# Patient Record
Sex: MALE | Race: WHITE | NOT HISPANIC OR LATINO | Employment: FULL TIME | URBAN - METROPOLITAN AREA
[De-identification: names, ages, dates, MRNs, and addresses within clinical notes are randomized per-mention and may not be internally consistent; named-entity substitution may affect disease eponyms.]

---

## 2017-02-03 ENCOUNTER — TELEPHONE (OUTPATIENT)
Dept: CARDIOLOGY | Facility: MEDICAL CENTER | Age: 57
End: 2017-02-03

## 2017-02-03 DIAGNOSIS — N52.9 ERECTILE DYSFUNCTION, UNSPECIFIED ERECTILE DYSFUNCTION TYPE: ICD-10-CM

## 2017-02-03 RX ORDER — SILDENAFIL 100 MG/1
100 TABLET, FILM COATED ORAL PRN
Qty: 10 TAB | Refills: 1 | Status: SHIPPED | OUTPATIENT
Start: 2017-02-03 | End: 2017-05-25 | Stop reason: SDUPTHER

## 2017-02-03 NOTE — TELEPHONE ENCOUNTER
----- Message from Florencia Quezada R.N. sent at 2/2/2017 11:31 AM PST -----  Regarding: requesting Viagra  CHANEL Andrews to RF this?  ----- Message -----     From: Lucero Gordon, Med Ass't     Sent: 2/2/2017  10:35 AM       To: Florencia Quezada R.N.  Subject: new RX                                           Patient is requesting a new RX for Viagra 100mg, please call patient back  Thank you

## 2017-05-01 DIAGNOSIS — I10 ESSENTIAL HYPERTENSION, BENIGN: ICD-10-CM

## 2017-05-01 RX ORDER — METOPROLOL SUCCINATE 50 MG/1
50 TABLET, EXTENDED RELEASE ORAL DAILY
Qty: 90 TAB | Refills: 0 | Status: CANCELLED | OUTPATIENT
Start: 2017-05-01

## 2017-05-01 RX ORDER — METOPROLOL SUCCINATE 50 MG/1
50 TABLET, EXTENDED RELEASE ORAL DAILY
Qty: 90 TAB | Refills: 3 | Status: SHIPPED | OUTPATIENT
Start: 2017-05-01 | End: 2018-05-12 | Stop reason: SDUPTHER

## 2017-05-19 ENCOUNTER — TELEPHONE (OUTPATIENT)
Dept: CARDIOLOGY | Facility: MEDICAL CENTER | Age: 57
End: 2017-05-19

## 2017-05-19 NOTE — TELEPHONE ENCOUNTER
----- Message from Shayne Najera Ass't sent at 5/19/2017  1:29 PM PDT -----  Regarding: Blood Work  Pt has appt. Coming up w/ SS on 5/25 @8:20am.  S/w pt states he has not had any recent labwork, pt is asking if he needs lab work before appt. Pt OMAYRA is in [place] and will be flying out Wednesday night to make it to appt.

## 2017-05-19 NOTE — TELEPHONE ENCOUNTER
Left detailed message for pt. That he should wait until appointment with Dr. Marshall to get a lab order, since appointment is in 5 days and he is in [place].

## 2017-05-25 ENCOUNTER — OFFICE VISIT (OUTPATIENT)
Dept: CARDIOLOGY | Facility: MEDICAL CENTER | Age: 57
End: 2017-05-25
Payer: COMMERCIAL

## 2017-05-25 VITALS
WEIGHT: 170 LBS | BODY MASS INDEX: 24.34 KG/M2 | OXYGEN SATURATION: 96 % | SYSTOLIC BLOOD PRESSURE: 130 MMHG | DIASTOLIC BLOOD PRESSURE: 80 MMHG | HEART RATE: 74 BPM | HEIGHT: 70 IN

## 2017-05-25 DIAGNOSIS — Z72.0 TOBACCO USE: ICD-10-CM

## 2017-05-25 DIAGNOSIS — N52.9 ERECTILE DYSFUNCTION, UNSPECIFIED ERECTILE DYSFUNCTION TYPE: ICD-10-CM

## 2017-05-25 DIAGNOSIS — I10 ESSENTIAL HYPERTENSION, BENIGN: ICD-10-CM

## 2017-05-25 DIAGNOSIS — I25.10 CORONARY ARTERY DISEASE INVOLVING NATIVE CORONARY ARTERY OF NATIVE HEART WITHOUT ANGINA PECTORIS: ICD-10-CM

## 2017-05-25 DIAGNOSIS — Z95.5 STENTED CORONARY ARTERY: ICD-10-CM

## 2017-05-25 PROCEDURE — 99213 OFFICE O/P EST LOW 20 MIN: CPT | Performed by: INTERNAL MEDICINE

## 2017-05-25 RX ORDER — SILDENAFIL 100 MG/1
100 TABLET, FILM COATED ORAL PRN
Qty: 10 TAB | Refills: 4 | Status: SHIPPED | OUTPATIENT
Start: 2017-05-25 | End: 2018-03-03 | Stop reason: SDUPTHER

## 2017-05-25 ASSESSMENT — ENCOUNTER SYMPTOMS
CONSTITUTIONAL NEGATIVE: 1
VOMITING: 0
PALPITATIONS: 0
NAUSEA: 0
WEAKNESS: 0
SHORTNESS OF BREATH: 0

## 2017-05-25 NOTE — MR AVS SNAPSHOT
"        Giovani Blake   2017 8:20 AM   Office Visit   MRN: [id number]    Department:  [facility]   Dept Phone:  [phone]    Description:  Male : 1960   Provider:  Kirit Marshall M.D.           Reason for Visit     Follow-Up           Allergies as of 2017     Allergen Noted Reactions    Lamisil [Terbinafine Hcl] 01/10/2014       Lamisil [Terbinafine Hcl] 01/10/2014   Hives, Swelling      You were diagnosed with     Coronary artery disease involving native coronary artery of native heart without angina pectoris   [2915728]       Stented coronary artery   [475840]       Essential hypertension, benign   [401.1.ICD-9-CM]       Tobacco use   [150939]       Erectile dysfunction, unspecified erectile dysfunction type   [1429588]         Vital Signs     Blood Pressure Pulse Height Weight Body Mass Index Oxygen Saturation    130/80 mmHg 74 1.778 m (5' 10\") 77.111 kg (170 lb) 24.39 kg/m2 96%    Smoking Status                   Current Every Day Smoker           Basic Information     Date Of Birth Sex Race Ethnicity Preferred Language    1960 Male White Non- English      Problem List              ICD-10-CM Priority Class Noted - Resolved    STEMI (ST elevation myocardial infarction) (CMS-HCC) I21.3 High  1/10/2014 - Present    CAD (coronary artery disease) I25.10 High  2014 - Present    Essential hypertension, benign I10 High  2014 - Present    HLD (hyperlipidemia) E78.5 High  2014 - Present    Tobacco use Z72.0 High  2014 - Present    CAD (coronary artery disease) bare-metal stent to an occluded circumflex in 2014 I25.10   2014 - Present    Hyperlipidemia E78.5   2014 - Present    Undiagnosed cardiac murmurs R01.1   2014 - Present    Stented coronary artery Z95.5   2015 - Present    Essential hypertension, benign I10   2015 - Present      Health Maintenance        Date Due Completion Dates    IMM DTaP/Tdap/Td Vaccine (1 - Tdap) " 8/20/1979 ---    IMM PNEUMOCOCCAL 19-64 (ADULT) MEDIUM RISK SERIES (1 of 1 - PPSV23) 8/20/1979 ---    COLONOSCOPY 8/20/2010 ---            Current Immunizations     No immunizations on file.      Below and/or attached are the medications your provider expects you to take. Review all of your home medications and newly ordered medications with your provider and/or pharmacist. Follow medication instructions as directed by your provider and/or pharmacist. Please keep your medication list with you and share with your provider. Update the information when medications are discontinued, doses are changed, or new medications (including over-the-counter products) are added; and carry medication information at all times in the event of emergency situations     Allergies:  LAMISIL - (reactions not documented)     LAMISIL - Hives,Swelling               Medications  Valid as of: May 25, 2017 -  8:44 AM    Generic Name Brand Name Tablet Size Instructions for use    Aspirin (Tablet Delayed Response) ECOTRIN 81 MG Take 81 mg by mouth every day.        Atorvastatin Calcium (Tab) LIPITOR 40 MG Take 1 Tab by mouth every bedtime.        Losartan Potassium (Tab) COZAAR 100 MG Take 1 Tab by mouth every day.        Metoprolol Succinate (TABLET SR 24 HR) TOPROL XL 50 MG Take 1 Tab by mouth every day.        Nitroglycerin (SL Tab) NITROSTAT 0.4 MG Place 1 Tab under tongue as needed for Chest Pain.        Sildenafil Citrate (Tab) VIAGRA 100 MG Take 1 Tab by mouth as needed for Erectile Dysfunction.        .                 Medicines prescribed today were sent to:     [facility]/PHARMACY #[id number] - [address] AT [address] [address]    Phone: [phone] Fax: [phone]    Open 24 Hours?: No      Medication refill instructions:       If your prescription bottle indicates you have medication refills left, it is not necessary to call your provider’s office. Please contact your  pharmacy and they will refill your medication.    If your prescription bottle indicates you do not have any refills left, you may request refills at any time through one of the following ways: The online [employer] system (except Urgent Care), by calling your provider’s office, or by asking your pharmacy to contact your provider’s office with a refill request. Medication refills are processed only during regular business hours and may not be available until the next business day. Your provider may request additional information or to have a follow-up visit with you prior to refilling your medication.   *Please Note: Medication refills are assigned a new Rx number when refilled electronically. Your pharmacy may indicate that no refills were authorized even though a new prescription for the same medication is available at the pharmacy. Please request the medicine by name with the pharmacy before contacting your provider for a refill.        Your To Do List     Future Labs/Procedures Complete By Expires    CBC WITH DIFFERENTIAL  As directed 11/24/2017    COMP METABOLIC PANEL  As directed 11/24/2017    LIPID PROFILE  As directed 11/24/2017    PROSTATE SPECIFIC AG DIAGNOSTIC  As directed 5/25/2018         [employer] Access Code: [email or web address]  Expires: 6/24/2017  8:44 AM    [employer]  A secure, online tool to manage your health information     [employer]’s [employer]® is a secure, online tool that connects you to your personalized health information from the privacy of your home -- day or night - making it very easy for you to manage your healthcare. Once the activation process is completed, you can even access your medical information using the [employer] clayton, which is available for free in the Apple Clayton store or Google Play store.     [employer] provides the following levels of access (as shown below):   My Chart Features   [facility] Doctor [facility]  Specialists [facility]  Urgent  Care Non-[facility]  Primary Care  Doctor    Email your healthcare team securely and privately 24/7 X X X    Manage appointments: schedule your next appointment; view details of past/upcoming appointments X      Request prescription refills. X      View recent personal medical records, including lab and immunizations X X X X   View health record, including health history, allergies, medications X X X X   Read reports about your outpatient visits, procedures, consult and ER notes X X X X   See your discharge summary, which is a recap of your hospital and/or ER visit that includes your diagnosis, lab results, and care plan. X X       How to register for [employer]:  1. Go to  [email or web address].  2. Click on the Sign Up Now box, which takes you to the New Member Sign Up page. You will need to provide the following information:  a. Enter your [employer] Access Code exactly as it appears at the top of this page. (You will not need to use this code after you’ve completed the sign-up process. If you do not sign up before the expiration date, you must request a new code.)   b. Enter your date of birth.   c. Enter your home email address.   d. Click Submit, and follow the next screen’s instructions.  3. Create a VoiceGem ID. This will be your VoiceG[employer] login ID and cannot be changed, so think of one that is secure and easy to remember.  4. Create a VoiceGem password. You can change your password at any time.  5. Enter your Password Reset Question and Answer. This can be used at a later time if you forget your password.   6. Enter your e-mail address. This allows you to receive e-mail notifications when new information is available in VoiceGem.  7. Click Sign Up. You can now view your health information.    For assistance activating your [employer] account, call [phone]

## 2017-05-25 NOTE — PROGRESS NOTES
Subjective:     Progress Notes    Giovani Blake (MR# [id number])         Progress Notes Info      Author Note Status Last Update User Last Update Date/Time     Kirit Marshall M.D. Signed Kirit Marshall M.D. 4/19/2016  1:40 PM       Progress Notes      Expand All Collapse All    Subjective:    Giovani Blake is a 56 y.o. male who presents today for follow-up of coronary artery disease. He suffered a myocardial infarction in January, 2014, and had a 3.5×23 mm millimeter vision bare metal stent placed in the circumflex. The patient remains pain free he is working hard. He is due for laboratory testing. The patient is still smoking cigarettes but is interested in quitting. He doesn't want to try Chantix.      Past Medical History    Diagnosis  Date    •  CAD (coronary artery disease)  January 2014        ACS. PCI/BMS (Vision 3.5 x 23mm) to the circumflex.    •  Hypertension      •  Hyperlipidemia      •  NSTEMI (non-ST elevated myocardial infarction)      •  COPD      •  CAD (coronary artery disease) bare-metal stent to an occluded circumflex in January 2014 12/17/2014    •  Hyperlipidemia  12/17/2014    •  Undiagnosed cardiac murmurs  12/17/2014      Past Surgical History    Procedure  Laterality  Date    •  Hernia repair          Family History    Problem  Relation  Age of Onset    •  Cancer  Father      •  Heart Disease  Father      •  Heart Attack  Father  57      History    Smoking status    •  Current Every Day Smoker -- 2.00 packs/day for 30 years    •  Types:  Cigarettes    Smokeless tobacco    •  Never Used      Allergies    Allergen  Reactions    •  Lamisil [Terbinafine Hcl]      •  Lamisil [Terbinafine Hcl]  Hives and Swelling       Encounter Medications     Outpatient Encounter Prescriptions as of 4/19/2016    Medication  Sig  Dispense  Refill    •  sildenafil citrate (VIAGRA) 100 MG tablet  Take 1 Tab by mouth as needed for Erectile Dysfunction.  10 Tab  1    •  losartan (COZAAR) 100 MG Tab  Take 1 Tab by  "mouth every day.  90 Tab  2    •  atorvastatin (LIPITOR) 40 MG Tab  Take 1 Tab by mouth every bedtime.  90 Tab  2    •  metoprolol SR (TOPROL XL) 50 MG TB24  Take 1 Tab by mouth every day.  90 Tab  3    •  aspirin EC (ECOTRIN) 81 MG TBEC  Take 81 mg by mouth every day.        •  [DISCONTINUED] sildenafil citrate (VIAGRA) 100 MG tablet  Take 1 Tab by mouth as needed for Erectile Dysfunction.  10 Tab  1    •  nitroglycerin (NITROSTAT) 0.4 MG SUBL  Place 1 Tab under tongue as needed for Chest Pain.  25 Tab  1        No facility-administered encounter medications on file as of 4/19/2016.         ROS       Objective:    /64 mmHg  Pulse 100  Ht 1.778 m (5' 10\")  Wt 76.204 kg (168 lb)  BMI 24.11 kg/m2  SpO2 94%    Physical Exam      Assessment:        1.  Coronary artery disease due to lipid rich plaque   LIPID PROFILE    2.  Essential hypertension, benign   LIPID PROFILE    3.  Stented coronary artery       4.  Hyperlipidemia   LIPID PROFILE    5.  Erectile dysfunction, unspecified erectile dysfunction type   sildenafil citrate (VIAGRA) 100 MG tablet          Medical Decision Making:  Today's Assessment / Status / Plan:      The patient will stop his losartan altogether checked pressures at home. He is currently working her [place], [place]. Smoking cessation was again recommended. We will check his lipid profile. Return in one year.                    Past Medical History   Diagnosis Date   • CAD (coronary artery disease) January 2014     ACS. PCI/BMS (Vision 3.5 x 23mm) to the circumflex.   • Hypertension    • Hyperlipidemia    • NSTEMI (non-ST elevated myocardial infarction)    • COPD    • CAD (coronary artery disease) bare-metal stent to an occluded circumflex in January 2014 12/17/2014   • Hyperlipidemia 12/17/2014   • Undiagnosed cardiac murmurs 12/17/2014     Past Surgical History   Procedure Laterality Date   • Hernia repair       Family History   Problem Relation Age of Onset   • Cancer Father    • " "Heart Disease Father    • Heart Attack Father 57     History   Smoking status   • Current Every Day Smoker -- 2.00 packs/day for 30 years   • Types: Cigarettes   Smokeless tobacco   • Never Used     Allergies   Allergen Reactions   • Lamisil [Terbinafine Hcl]    • Lamisil [Terbinafine Hcl] Hives and Swelling     Outpatient Encounter Prescriptions as of 5/25/2017   Medication Sig Dispense Refill   • sildenafil citrate (VIAGRA) 100 MG tablet Take 1 Tab by mouth as needed for Erectile Dysfunction. 10 Tab 4   • metoprolol SR (TOPROL XL) 50 MG TABLET SR 24 HR Take 1 Tab by mouth every day. 90 Tab 3   • atorvastatin (LIPITOR) 40 MG Tab Take 1 Tab by mouth every bedtime. 90 Tab 3   • aspirin EC (ECOTRIN) 81 MG TBEC Take 81 mg by mouth every day.     • [DISCONTINUED] sildenafil citrate (VIAGRA) 100 MG tablet Take 1 Tab by mouth as needed for Erectile Dysfunction. (Patient not taking: Reported on 5/25/2017) 10 Tab 1   • losartan (COZAAR) 100 MG Tab Take 1 Tab by mouth every day. (Patient not taking: Reported on 5/25/2017) 90 Tab 2   • nitroglycerin (NITROSTAT) 0.4 MG SUBL Place 1 Tab under tongue as needed for Chest Pain. 25 Tab 1     No facility-administered encounter medications on file as of 5/25/2017.     Review of Systems   Constitutional: Negative.  Negative for malaise/fatigue.   Respiratory: Negative for shortness of breath.    Cardiovascular: Negative for chest pain, palpitations and leg swelling.   Gastrointestinal: Negative for nausea and vomiting.   Neurological: Negative for weakness.        Objective:   /80 mmHg  Pulse 74  Ht 1.778 m (5' 10\")  Wt 77.111 kg (170 lb)  BMI 24.39 kg/m2  SpO2 96%    Physical Exam   Constitutional: He is oriented to person, place, and time. He appears well-developed and well-nourished. No distress.   HENT:   Head: Atraumatic.   Eyes: Conjunctivae and EOM are normal. Pupils are equal, round, and reactive to light.   Neck: Neck supple. No JVD present.   Cardiovascular: " Normal rate and regular rhythm.    No murmur heard.  Pulmonary/Chest: Effort normal and breath sounds normal. No respiratory distress. He has no wheezes. He has no rales.   Abdominal: Soft. There is no tenderness.   Musculoskeletal: He exhibits no edema.   Neurological: He is alert and oriented to person, place, and time.   Skin: Skin is warm and dry. He is not diaphoretic.       Assessment:     1. Coronary artery disease involving native coronary artery of native heart without angina pectoris     2. Stented coronary artery  LIPID PROFILE    CBC WITH DIFFERENTIAL   3. Essential hypertension, benign  COMP METABOLIC PANEL    LIPID PROFILE    CBC WITH DIFFERENTIAL    PROSTATE SPECIFIC AG DIAGNOSTIC   4. Tobacco use  PROSTATE SPECIFIC AG DIAGNOSTIC   5. Erectile dysfunction, unspecified erectile dysfunction type  sildenafil citrate (VIAGRA) 100 MG tablet       Medical Decision Making:  Today's Assessment / Status / Plan:     He continues to do well without any angina. We will obtain appropriate laboratory testing and notify him of the results. Since he lives out of town and is back here only briefly, it was difficult to get stress testing on him. We'll discuss this with him next visit and attempt to get a stress test performed either here or in the city where he is currently working.

## 2017-09-05 DIAGNOSIS — E78.5 HYPERLIPIDEMIA, UNSPECIFIED HYPERLIPIDEMIA TYPE: ICD-10-CM

## 2017-09-06 RX ORDER — ATORVASTATIN CALCIUM 40 MG/1
40 TABLET, FILM COATED ORAL
Qty: 30 TAB | Refills: 1 | Status: SHIPPED | OUTPATIENT
Start: 2017-09-06 | End: 2017-11-01 | Stop reason: SDUPTHER

## 2017-12-04 DIAGNOSIS — E78.5 HYPERLIPIDEMIA, UNSPECIFIED HYPERLIPIDEMIA TYPE: ICD-10-CM

## 2017-12-04 RX ORDER — ATORVASTATIN CALCIUM 40 MG/1
40 TABLET, FILM COATED ORAL EVERY EVENING
Qty: 90 TAB | Refills: 2 | Status: SHIPPED | OUTPATIENT
Start: 2017-12-04 | End: 2018-08-16 | Stop reason: SDUPTHER

## 2018-03-03 DIAGNOSIS — N52.9 ERECTILE DYSFUNCTION, UNSPECIFIED ERECTILE DYSFUNCTION TYPE: ICD-10-CM

## 2018-03-05 RX ORDER — SILDENAFIL 100 MG/1
100 TABLET, FILM COATED ORAL PRN
Qty: 10 TAB | Refills: 4 | Status: SHIPPED | OUTPATIENT
Start: 2018-03-05 | End: 2018-09-18 | Stop reason: SDUPTHER

## 2018-05-07 ENCOUNTER — TELEPHONE (OUTPATIENT)
Dept: CARDIOLOGY | Facility: MEDICAL CENTER | Age: 58
End: 2018-05-07

## 2018-05-07 DIAGNOSIS — E78.49 OTHER HYPERLIPIDEMIA: ICD-10-CM

## 2018-05-07 NOTE — TELEPHONE ENCOUNTER
----- Message from Juliana Rollins sent at 5/7/2018 10:23 AM PDT -----  Regarding: blood work for Aug appt  Contact: [phone]  REINA/carlos    Pt calling to ask if blood work is necessary for 8/24 appt with RS.  He wants to be prepared as he will be flying in from [place] for this appt.  Please call pt at .

## 2018-05-12 DIAGNOSIS — I10 ESSENTIAL HYPERTENSION, BENIGN: ICD-10-CM

## 2018-05-14 RX ORDER — METOPROLOL SUCCINATE 50 MG/1
50 TABLET, EXTENDED RELEASE ORAL DAILY
Qty: 90 TAB | Refills: 2 | Status: SHIPPED | OUTPATIENT
Start: 2018-05-14 | End: 2019-03-24 | Stop reason: SDUPTHER

## 2018-08-16 DIAGNOSIS — E78.5 HYPERLIPIDEMIA, UNSPECIFIED HYPERLIPIDEMIA TYPE: ICD-10-CM

## 2018-08-17 RX ORDER — ATORVASTATIN CALCIUM 40 MG/1
TABLET, FILM COATED ORAL
Qty: 90 TAB | Refills: 0 | Status: SHIPPED | OUTPATIENT
Start: 2018-08-17 | End: 2018-11-14 | Stop reason: SDUPTHER

## 2018-08-24 ENCOUNTER — OFFICE VISIT (OUTPATIENT)
Dept: CARDIOLOGY | Facility: MEDICAL CENTER | Age: 58
End: 2018-08-24

## 2018-08-24 VITALS
SYSTOLIC BLOOD PRESSURE: 140 MMHG | RESPIRATION RATE: 14 BRPM | OXYGEN SATURATION: 95 % | DIASTOLIC BLOOD PRESSURE: 78 MMHG | HEIGHT: 70 IN | BODY MASS INDEX: 23.62 KG/M2 | WEIGHT: 165 LBS | HEART RATE: 68 BPM

## 2018-08-24 DIAGNOSIS — I25.10 CORONARY ARTERY DISEASE INVOLVING NATIVE CORONARY ARTERY OF NATIVE HEART WITHOUT ANGINA PECTORIS: ICD-10-CM

## 2018-08-24 DIAGNOSIS — I10 ESSENTIAL HYPERTENSION, BENIGN: ICD-10-CM

## 2018-08-24 DIAGNOSIS — Z72.0 TOBACCO USE: ICD-10-CM

## 2018-08-24 PROCEDURE — 99213 OFFICE O/P EST LOW 20 MIN: CPT | Performed by: INTERNAL MEDICINE

## 2018-08-24 ASSESSMENT — ENCOUNTER SYMPTOMS
NEUROLOGICAL NEGATIVE: 1
GASTROINTESTINAL NEGATIVE: 1
MUSCULOSKELETAL NEGATIVE: 1
CARDIOVASCULAR NEGATIVE: 1
RESPIRATORY NEGATIVE: 1
CONSTITUTIONAL NEGATIVE: 1

## 2018-08-24 NOTE — PROGRESS NOTES
Chief Complaint   Patient presents with   • Coronary Artery Disease     Yearly check up.        Subjective:   Giovani Blake is a 58 y.o. male who presents today for follow-up of coronary disease who presented with a ST segment elevation myocardial infarction in January, 2014 and 3.5×13 mm vision bare-metal stent placed in the mid circumflex.  He continues to do well without any chest pain or exertional dyspnea.  He is due for laboratory testing.  Unfortunately he is still smoking cigarettes.  The patient is also due for treadmill testing.  Because of his employment as a , he works all over the country and is now in [place].    Past Medical History:   Diagnosis Date   • CAD (coronary artery disease) January 2014    ACS. PCI/BMS (Vision 3.5 x 23mm) to the circumflex.   • CAD (coronary artery disease) bare-metal stent to an occluded circumflex in January 2014 12/17/2014   • COPD    • Hyperlipidemia    • Hyperlipidemia 12/17/2014   • Hypertension    • NSTEMI (non-ST elevated myocardial infarction) (HCC)    • Undiagnosed cardiac murmurs 12/17/2014     Past Surgical History:   Procedure Laterality Date   • HERNIA REPAIR       Family History   Problem Relation Age of Onset   • Cancer Father    • Heart Disease Father    • Heart Attack Father 57     Social History     Social History   • Marital status:      Spouse name: N/A   • Number of children: N/A   • Years of education: N/A     Occupational History   • Not on file.     Social History Main Topics   • Smoking status: Current Every Day Smoker     Packs/day: 2.00     Years: 30.00     Types: Cigarettes   • Smokeless tobacco: Never Used   • Alcohol use 6.0 oz/week     21 Cans of beer per week   • Drug use: No   • Sexual activity: Not on file     Other Topics Concern   • Not on file     Social History Narrative    ** Merged History Encounter **          Allergies   Allergen Reactions   • Lamisil [Terbinafine Hcl]      Outpatient Encounter  "Prescriptions as of 8/24/2018   Medication Sig Dispense Refill   • atorvastatin (LIPITOR) 40 MG Tab TAKE 1 TABLET BY MOUTH EVERY EVENING 90 Tab 0   • metoprolol SR (TOPROL XL) 50 MG TABLET SR 24 HR TAKE 1 TAB BY MOUTH EVERY DAY. 90 Tab 2   • sildenafil citrate (VIAGRA) 100 MG tablet TAKE 1 TAB BY MOUTH AS NEEDED FOR ERECTILE DYSFUNCTION. 10 Tab 4   • aspirin EC (ECOTRIN) 81 MG TBEC Take 81 mg by mouth every day.     • nitroglycerin (NITROSTAT) 0.4 MG SUBL Place 1 Tab under tongue as needed for Chest Pain. 25 Tab 1   • losartan (COZAAR) 100 MG Tab Take 1 Tab by mouth every day. (Patient not taking: Reported on 5/25/2017) 90 Tab 2     No facility-administered encounter medications on file as of 8/24/2018.      Review of Systems   Constitutional: Negative.    HENT: Negative.    Respiratory: Negative.    Cardiovascular: Negative.    Gastrointestinal: Negative.    Musculoskeletal: Negative.    Skin: Negative.    Neurological: Negative.    Endo/Heme/Allergies: Negative.         Objective:   /78   Pulse 68   Resp 14   Ht 1.778 m (5' 10\")   Wt 74.8 kg (165 lb)   SpO2 95%   BMI 23.68 kg/m²     Physical Exam   Constitutional: He is oriented to person, place, and time. No distress.   HENT:   Head: Normocephalic and atraumatic.   Cardiovascular: Normal rate and regular rhythm.    No murmur heard.  Pulmonary/Chest: Breath sounds normal. No respiratory distress. He has no wheezes.   Decreased breath sounds and increased resonance to percussion compatible with COPD.   Abdominal: Soft. Bowel sounds are normal. He exhibits no distension.   Musculoskeletal: He exhibits no edema.   Neurological: He is alert and oriented to person, place, and time.   Skin: Skin is warm and dry.   Psychiatric: He has a normal mood and affect.       Assessment:     1. Coronary artery disease involving native coronary artery of native heart without angina pectoris  CBC WITH DIFFERENTIAL    LIPID PROFILE    COMP METABOLIC PANEL    NM-CARDIAC " STRESS TEST   2. Essential hypertension, benign  CBC WITH DIFFERENTIAL    LIPID PROFILE    COMP METABOLIC PANEL    NM-CARDIAC STRESS TEST   3. Tobacco use  CBC WITH DIFFERENTIAL    LIPID PROFILE    COMP METABOLIC PANEL    NM-CARDIAC STRESS TEST       Medical Decision Making:  Today's Assessment / Status / Plan:   Coronary artery disease: 4 4-1/2 years post stenting of his circumflex remains asymptomatic.  He is due for treadmill testing.  Because of his work schedule and the fact that he just changed jobs.  He will be living in [place].  As soon as he gets insurance coverage he will call us and we will arrange for stress testing with a myocardial perfusion scan.  Appropriate labs will also be ordered.    Cigarette smoking: He was informed that he has stigmata of COPD.  Smoking cessation was again strongly encouraged.    Hypertension: Under good control on his current medical regimen.  Return one  Year.

## 2018-09-18 DIAGNOSIS — N52.9 ERECTILE DYSFUNCTION, UNSPECIFIED ERECTILE DYSFUNCTION TYPE: ICD-10-CM

## 2018-09-18 RX ORDER — SILDENAFIL 100 MG/1
100 TABLET, FILM COATED ORAL PRN
Qty: 10 TAB | Refills: 1 | Status: SHIPPED | OUTPATIENT
Start: 2018-09-18 | End: 2018-11-19 | Stop reason: SDUPTHER

## 2018-11-14 DIAGNOSIS — E78.5 HYPERLIPIDEMIA, UNSPECIFIED HYPERLIPIDEMIA TYPE: ICD-10-CM

## 2018-11-14 RX ORDER — ATORVASTATIN CALCIUM 40 MG/1
40 TABLET, FILM COATED ORAL DAILY
Qty: 90 TAB | Refills: 1 | Status: SHIPPED | OUTPATIENT
Start: 2018-11-14 | End: 2019-07-29 | Stop reason: SDUPTHER

## 2018-11-19 DIAGNOSIS — N52.9 ERECTILE DYSFUNCTION, UNSPECIFIED ERECTILE DYSFUNCTION TYPE: ICD-10-CM

## 2018-11-20 RX ORDER — SILDENAFIL 100 MG/1
100 TABLET, FILM COATED ORAL PRN
Qty: 10 TAB | Refills: 3 | Status: SHIPPED | OUTPATIENT
Start: 2018-11-20 | End: 2019-03-06 | Stop reason: SDUPTHER

## 2019-03-06 DIAGNOSIS — N52.9 ERECTILE DYSFUNCTION, UNSPECIFIED ERECTILE DYSFUNCTION TYPE: ICD-10-CM

## 2019-03-07 RX ORDER — SILDENAFIL 100 MG/1
100 TABLET, FILM COATED ORAL PRN
Qty: 10 TAB | Refills: 3 | Status: SHIPPED | OUTPATIENT
Start: 2019-03-07 | End: 2019-07-29 | Stop reason: SDUPTHER

## 2019-03-24 DIAGNOSIS — I10 ESSENTIAL HYPERTENSION, BENIGN: ICD-10-CM

## 2019-03-25 RX ORDER — METOPROLOL SUCCINATE 50 MG/1
TABLET, EXTENDED RELEASE ORAL
Qty: 90 TAB | Refills: 1 | Status: SHIPPED | OUTPATIENT
Start: 2019-03-25 | End: 2019-08-24 | Stop reason: SDUPTHER

## 2019-07-29 ENCOUNTER — TELEPHONE (OUTPATIENT)
Dept: CARDIOLOGY | Facility: MEDICAL CENTER | Age: 59
End: 2019-07-29

## 2019-07-29 DIAGNOSIS — N52.9 ERECTILE DYSFUNCTION, UNSPECIFIED ERECTILE DYSFUNCTION TYPE: ICD-10-CM

## 2019-07-29 DIAGNOSIS — E78.5 HYPERLIPIDEMIA, UNSPECIFIED HYPERLIPIDEMIA TYPE: ICD-10-CM

## 2019-07-29 RX ORDER — ATORVASTATIN CALCIUM 40 MG/1
40 TABLET, FILM COATED ORAL DAILY
Qty: 30 TAB | Refills: 0 | Status: SHIPPED | OUTPATIENT
Start: 2019-07-29 | End: 2019-08-24 | Stop reason: SDUPTHER

## 2019-07-29 RX ORDER — SILDENAFIL 100 MG/1
100 TABLET, FILM COATED ORAL PRN
Qty: 10 TAB | Refills: 0 | Status: SHIPPED | OUTPATIENT
Start: 2019-07-29 | End: 2019-11-01

## 2019-07-29 NOTE — TELEPHONE ENCOUNTER
Good afternoon,    I spoke with the patient and they are wanting to know if they need to have their labs done before their appointment in November. If so, please send to the address on file.    Thank you for your time,  Katelin ROSALES

## 2019-08-24 DIAGNOSIS — E78.5 HYPERLIPIDEMIA, UNSPECIFIED HYPERLIPIDEMIA TYPE: ICD-10-CM

## 2019-08-24 DIAGNOSIS — I10 ESSENTIAL HYPERTENSION, BENIGN: ICD-10-CM

## 2019-08-26 RX ORDER — METOPROLOL SUCCINATE 50 MG/1
50 TABLET, EXTENDED RELEASE ORAL DAILY
Qty: 90 TAB | Refills: 0 | Status: SHIPPED | OUTPATIENT
Start: 2019-08-26 | End: 2019-11-27 | Stop reason: SDUPTHER

## 2019-08-26 RX ORDER — ATORVASTATIN CALCIUM 40 MG/1
TABLET, FILM COATED ORAL
Qty: 90 TAB | Refills: 0 | Status: SHIPPED | OUTPATIENT
Start: 2019-08-26 | End: 2019-11-27 | Stop reason: SDUPTHER

## 2019-11-01 ENCOUNTER — TELEPHONE (OUTPATIENT)
Dept: CARDIOLOGY | Facility: MEDICAL CENTER | Age: 59
End: 2019-11-01

## 2019-11-01 DIAGNOSIS — N52.9 ERECTILE DYSFUNCTION, UNSPECIFIED ERECTILE DYSFUNCTION TYPE: ICD-10-CM

## 2019-11-01 RX ORDER — SILDENAFIL 100 MG/1
100 TABLET, FILM COATED ORAL PRN
Qty: 20 TAB | Refills: 4 | Status: SHIPPED | OUTPATIENT
Start: 2019-11-01 | End: 2020-11-16 | Stop reason: SDUPTHER

## 2019-11-01 NOTE — TELEPHONE ENCOUNTER
----- Message from Kirit Marshall M.D. sent at 11/1/2019 12:44 PM PDT -----  Regarding: RE: Please advise re: Viagra   Okay to refill with sildenafil 100 mg.  Dispense 20 if okay with pharmacy.  Four refills.  ----- Message -----  From: Graciela Lopes R.N.  Sent: 10/31/2019  11:46 AM PDT  To: Kirit Marshall M.D.  Subject: Please advise re: Viagra                         Can he get a refill for #10?    ----- Message -----  From: Graciela Lopes R.N.  Sent: 10/30/2019   2:47 PM PDT  To: Graciela Lopes R.N.        ----- Message -----  From: Lucero Gordon, Med Ass't  Sent: 10/29/2019   1:54 PM PDT  To: Alyse Baker L.P.N.    Patient is requesting a call back regarding his sildenafil citrate (VIAGRA) 100 MG

## 2019-11-01 NOTE — TELEPHONE ENCOUNTER
Called pt. To advise. Per his request, refill ent to [facility] in [place], [place].

## 2019-11-14 ENCOUNTER — OFFICE VISIT (OUTPATIENT)
Dept: CARDIOLOGY | Facility: MEDICAL CENTER | Age: 59
End: 2019-11-14

## 2019-11-14 VITALS
HEIGHT: 70 IN | WEIGHT: 176 LBS | HEART RATE: 90 BPM | SYSTOLIC BLOOD PRESSURE: 170 MMHG | OXYGEN SATURATION: 93 % | BODY MASS INDEX: 25.2 KG/M2 | DIASTOLIC BLOOD PRESSURE: 70 MMHG

## 2019-11-14 DIAGNOSIS — Z72.0 TOBACCO USE: ICD-10-CM

## 2019-11-14 DIAGNOSIS — I10 ESSENTIAL HYPERTENSION, BENIGN: ICD-10-CM

## 2019-11-14 DIAGNOSIS — E78.2 MIXED HYPERLIPIDEMIA: ICD-10-CM

## 2019-11-14 DIAGNOSIS — Z95.5 STENTED CORONARY ARTERY: ICD-10-CM

## 2019-11-14 DIAGNOSIS — I25.10 CORONARY ARTERY DISEASE INVOLVING NATIVE CORONARY ARTERY OF NATIVE HEART WITHOUT ANGINA PECTORIS: ICD-10-CM

## 2019-11-14 PROCEDURE — 99214 OFFICE O/P EST MOD 30 MIN: CPT | Performed by: INTERNAL MEDICINE

## 2019-11-14 RX ORDER — VARENICLINE TARTRATE 1 MG/1
1 TABLET, FILM COATED ORAL 2 TIMES DAILY
Qty: 60 TAB | Refills: 3 | Status: SHIPPED | OUTPATIENT
Start: 2019-11-14

## 2019-11-14 RX ORDER — OMEPRAZOLE 20 MG/1
20 CAPSULE, DELAYED RELEASE ORAL DAILY
COMMUNITY

## 2019-11-14 ASSESSMENT — ENCOUNTER SYMPTOMS
MUSCULOSKELETAL NEGATIVE: 1
GASTROINTESTINAL NEGATIVE: 1
CONSTITUTIONAL NEGATIVE: 1
RESPIRATORY NEGATIVE: 1
CARDIOVASCULAR NEGATIVE: 1
NEUROLOGICAL NEGATIVE: 1

## 2019-11-14 NOTE — PROGRESS NOTES
Chief Complaint   Patient presents with   • Coronary Artery Disease     Follow up       Subjective:   Giovani Blake is a 59 y.o. male who presents today for follow-up of coronary disease with acute inferior wall MI in January 2014.  At that time he had a 3.5 x 23 mm bare-metal stent placed in the circumflex.  He remains pain-free and is quite busy at work.  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  The patient is due for lab work.  He is still smoking cigarettes.  He has gotten  and expresses strong desire to quit smoking.  No other interval health issues.    Past Medical History:   Diagnosis Date   • CAD (coronary artery disease) January 2014    ACS. PCI/BMS (Vision 3.5 x 23mm) to the circumflex.   • CAD (coronary artery disease) bare-metal stent to an occluded circumflex in January 2014 12/17/2014   • COPD    • Hyperlipidemia    • Hyperlipidemia 12/17/2014   • Hypertension    • NSTEMI (non-ST elevated myocardial infarction) (HCC)    • Undiagnosed cardiac murmurs 12/17/2014     Past Surgical History:   Procedure Laterality Date   • HERNIA REPAIR       Family History   Problem Relation Age of Onset   • Cancer Father    • Heart Disease Father    • Heart Attack Father 57     Social History     Socioeconomic History   • Marital status:      Spouse name: Not on file   • Number of children: Not on file   • Years of education: Not on file   • Highest education level: Not on file   Occupational History   • Not on file   Social Needs   • Financial resource strain: Not on file   • Food insecurity:     Worry: Not on file     Inability: Not on file   • Transportation needs:     Medical: Not on file     Non-medical: Not on file   Tobacco Use   • Smoking status: Current Every Day Smoker     Packs/day: 2.00     Years: 30.00     Pack years: 60.00     Types: Cigarettes   • Smokeless tobacco: Never Used   Substance and Sexual Activity   • Alcohol use: Yes     Alcohol/week: 6.0 oz      Types: 21 Cans of beer per week   • Drug use: No   • Sexual activity: Not on file   Lifestyle   • Physical activity:     Days per week: Not on file     Minutes per session: Not on file   • Stress: Not on file   Relationships   • Social connections:     Talks on phone: Not on file     Gets together: Not on file     Attends Faith service: Not on file     Active member of club or organization: Not on file     Attends meetings of clubs or organizations: Not on file     Relationship status: Not on file   • Intimate partner violence:     Fear of current or ex partner: Not on file     Emotionally abused: Not on file     Physically abused: Not on file     Forced sexual activity: Not on file   Other Topics Concern   • Not on file   Social History Narrative    ** Merged History Encounter **          Allergies   Allergen Reactions   • Lamisil [Terbinafine Hcl]      Outpatient Encounter Medications as of 11/14/2019   Medication Sig Dispense Refill   • omeprazole (PRILOSEC) 20 MG delayed-release capsule Take 20 mg by mouth every day.     • varenicline (CHANTIX STARTING MONTH PAK) 0.5 MG X 11 & 1 MG X 42 tablet As directed 56 Tab 3   • varenicline (CHANTIX CONTINUING MONTH PAK) 1 MG tablet Take 1 Tab by mouth 2 times a day. 60 Tab 3   • sildenafil citrate (VIAGRA) 100 MG tablet Take 1 Tab by mouth as needed for Erectile Dysfunction. 20 Tab 4   • atorvastatin (LIPITOR) 40 MG Tab TAKE 1 TABLET BY MOUTH EVERY DAY. NEEDS TO BE SEEN FOR FURTHER REFILLS. THANK YOU 90 Tab 0   • metoprolol SR (TOPROL XL) 50 MG TABLET SR 24 HR Take 1 Tab by mouth every day. Needs to be seen for further refills. Thank you 90 Tab 0   • aspirin EC (ECOTRIN) 81 MG TBEC Take 81 mg by mouth every day.     • nitroglycerin (NITROSTAT) 0.4 MG SUBL Place 1 Tab under tongue as needed for Chest Pain. 25 Tab 1   • losartan (COZAAR) 100 MG Tab Take 1 Tab by mouth every day. (Patient not taking: Reported on 5/25/2017) 90 Tab 2     No facility-administered encounter  "medications on file as of 11/14/2019.      Review of Systems   Constitutional: Negative.    HENT: Negative.    Respiratory: Negative.    Cardiovascular: Negative.    Gastrointestinal: Negative.    Musculoskeletal: Negative.    Skin: Negative.    Neurological: Negative.    Endo/Heme/Allergies: Negative.         Objective:   BP (!) 170/70 (BP Location: Left arm, Patient Position: Sitting, BP Cuff Size: Adult)   Pulse 90   Ht 1.778 m (5' 10\")   Wt 79.8 kg (176 lb)   SpO2 93%   BMI 25.25 kg/m²     Physical Exam   Constitutional: He is oriented to person, place, and time. He appears well-nourished. No distress.   HENT:   Head: Normocephalic and atraumatic.   Eyes: Pupils are equal, round, and reactive to light. No scleral icterus.   Neck: No JVD present. No tracheal deviation present.   Cardiovascular: Normal rate and regular rhythm.   No murmur heard.  Pulmonary/Chest: Breath sounds normal. No respiratory distress. He has no wheezes.   Abdominal: Soft. Bowel sounds are normal. He exhibits no distension. There is no tenderness.   Musculoskeletal:         General: No edema.   Neurological: He is alert and oriented to person, place, and time.   Skin: Skin is warm and dry.   Psychiatric: He has a normal mood and affect.       Assessment:     1. Stented coronary artery  Lipid Profile    Comp Metabolic Panel    NM-CARDIAC STRESS TEST   2. Mixed hyperlipidemia  Lipid Profile    Comp Metabolic Panel    NM-CARDIAC STRESS TEST   3. Essential hypertension, benign  Lipid Profile    Comp Metabolic Panel    NM-CARDIAC STRESS TEST   4. Coronary artery disease involving native coronary artery of native heart without angina pectoris     5. Tobacco use  Lipid Profile    Comp Metabolic Panel    NM-CARDIAC STRESS TEST       Medical Decision Making:  Today's Assessment / Status / Plan:   Status post inferior wall MI: Patient suffered an inferior wall MI in January, 2014.  He is due for stress testing.  Because of his work schedule and " travel we will need to arrange his back home in [place].  Hyperlipidemia: He is due for lab and will be notified of the results.    Cigarette smoking: Discussed strategies to quit smoking.  Is concerned about nightmares with Chantix but we will try this.  Instructions on how to use Chantix and transitioning to nicotine replacement was discussed.  Return one year.

## 2019-11-27 DIAGNOSIS — E78.5 HYPERLIPIDEMIA, UNSPECIFIED HYPERLIPIDEMIA TYPE: ICD-10-CM

## 2019-11-27 DIAGNOSIS — I10 ESSENTIAL HYPERTENSION, BENIGN: ICD-10-CM

## 2019-11-27 RX ORDER — ATORVASTATIN CALCIUM 40 MG/1
40 TABLET, FILM COATED ORAL DAILY
Qty: 90 TAB | Refills: 3 | Status: SHIPPED | OUTPATIENT
Start: 2019-11-27 | End: 2020-11-16 | Stop reason: SDUPTHER

## 2019-11-27 RX ORDER — METOPROLOL SUCCINATE 50 MG/1
50 TABLET, EXTENDED RELEASE ORAL DAILY
Qty: 90 TAB | Refills: 3 | Status: SHIPPED | OUTPATIENT
Start: 2019-11-27 | End: 2020-11-16 | Stop reason: SDUPTHER

## 2020-11-16 DIAGNOSIS — N52.9 ERECTILE DYSFUNCTION, UNSPECIFIED ERECTILE DYSFUNCTION TYPE: ICD-10-CM

## 2020-11-16 DIAGNOSIS — I10 ESSENTIAL HYPERTENSION, BENIGN: ICD-10-CM

## 2020-11-16 DIAGNOSIS — E78.5 HYPERLIPIDEMIA, UNSPECIFIED HYPERLIPIDEMIA TYPE: ICD-10-CM

## 2020-11-17 RX ORDER — METOPROLOL SUCCINATE 50 MG/1
50 TABLET, EXTENDED RELEASE ORAL DAILY
Qty: 90 TAB | Refills: 0 | Status: SHIPPED | OUTPATIENT
Start: 2020-11-17 | End: 2020-12-11 | Stop reason: SDUPTHER

## 2020-11-17 RX ORDER — SILDENAFIL 100 MG/1
100 TABLET, FILM COATED ORAL PRN
Qty: 20 TAB | Refills: 1 | Status: SHIPPED | OUTPATIENT
Start: 2020-11-17 | End: 2020-12-11 | Stop reason: SDUPTHER

## 2020-11-17 RX ORDER — ATORVASTATIN CALCIUM 40 MG/1
40 TABLET, FILM COATED ORAL DAILY
Qty: 90 TAB | Refills: 0 | Status: SHIPPED | OUTPATIENT
Start: 2020-11-17 | End: 2020-12-11 | Stop reason: SDUPTHER

## 2020-11-25 ENCOUNTER — TELEPHONE (OUTPATIENT)
Dept: CARDIOLOGY | Facility: MEDICAL CENTER | Age: 60
End: 2020-11-25

## 2020-11-25 NOTE — TELEPHONE ENCOUNTER
RS    Pt calling in regards to an appt for a tooth extraction on 12/21 by [facility]. Pt states they need a clearance for pts Blood thinner Rx.   Fax# [phone]    Please call pt back at [phone].    Thank you

## 2020-12-01 NOTE — TELEPHONE ENCOUNTER
TO: 1p/Tues/Ofc  NM: Giovani Blake   PH: [phone]   ALT PH: NO   PT NM: Giovani Blake    : 60   REG DR: Dr Marshall   RE: Was returning Darline's call   DISP HIST: 2020 12:57P SM p/disp  2020 12:58P  checked

## 2020-12-02 NOTE — TELEPHONE ENCOUNTER
"Upon chart review, pt was last seen by MD 11/2019.    Returned pt call and reviewed findings.  Offered pt to be seen tomorrow by APRN opening, however pt is currently living in [place] for work.  Offered pt to have VV with MD on 12/11 at 1600, he accepts.  Encouraged pt to contact oral surgeon to fax clearance request to this office.  Pt states he attempted to but was told that \"they didn't need to send a request, but still wanted a clearance.\"  Received phone number to oral surgeon: [phone].  Reassurance given that VC, , will contact pt to assist with VV set up.  He verbalizes understanding and states no other concerns or questions at this time.  Pt is appreciative of information given.    Attempted to call [facility] and received voicemail that office is closed at this time.  Office hours stated: M-Th 0815-4:30 F: 815-12.    Will attempt to follow up with oral surgeon at a later time.  Task deferred to  to contact pt.  "

## 2020-12-11 ENCOUNTER — TELEMEDICINE (OUTPATIENT)
Dept: CARDIOLOGY | Facility: MEDICAL CENTER | Age: 60
End: 2020-12-11

## 2020-12-11 VITALS — WEIGHT: 168 LBS | BODY MASS INDEX: 24.05 KG/M2 | HEIGHT: 70 IN

## 2020-12-11 DIAGNOSIS — I21.11 ST ELEVATION MYOCARDIAL INFARCTION INVOLVING RIGHT CORONARY ARTERY (HCC): ICD-10-CM

## 2020-12-11 DIAGNOSIS — Z72.0 TOBACCO USE: ICD-10-CM

## 2020-12-11 DIAGNOSIS — I10 ESSENTIAL HYPERTENSION, BENIGN: ICD-10-CM

## 2020-12-11 DIAGNOSIS — Z95.5 STENTED CORONARY ARTERY: ICD-10-CM

## 2020-12-11 DIAGNOSIS — N52.9 ERECTILE DYSFUNCTION, UNSPECIFIED ERECTILE DYSFUNCTION TYPE: ICD-10-CM

## 2020-12-11 DIAGNOSIS — E78.5 HYPERLIPIDEMIA, UNSPECIFIED HYPERLIPIDEMIA TYPE: ICD-10-CM

## 2020-12-11 PROCEDURE — 99213 OFFICE O/P EST LOW 20 MIN: CPT | Mod: 95,CR | Performed by: INTERNAL MEDICINE

## 2020-12-11 RX ORDER — METOPROLOL SUCCINATE 50 MG/1
50 TABLET, EXTENDED RELEASE ORAL DAILY
Qty: 90 TAB | Refills: 3 | Status: SHIPPED | OUTPATIENT
Start: 2020-12-11

## 2020-12-11 RX ORDER — ATORVASTATIN CALCIUM 40 MG/1
40 TABLET, FILM COATED ORAL DAILY
Qty: 90 TAB | Refills: 3 | Status: SHIPPED | OUTPATIENT
Start: 2020-12-11

## 2020-12-11 RX ORDER — SILDENAFIL 100 MG/1
100 TABLET, FILM COATED ORAL PRN
Qty: 20 TAB | Refills: 7 | Status: SHIPPED | OUTPATIENT
Start: 2020-12-11

## 2020-12-12 NOTE — PROGRESS NOTES
Virtual Visit: Established Patient   This visit was conducted via Zoom using secure and encrypted videoconferencing technology. The patient was in a private location in the state of [place].    The patient's identity was confirmed and verbal consent was obtained for this virtual visit.    Subjective:   CC:   Chief Complaint   Patient presents with   • Coronary Artery Disease     F/V Dx: CAD (coronary artery disease) bare-metal stent to an occluded circumflex in January 2014   • Hyperlipidemia   • HTN (Controlled)       Giovani Blake is a 60 y.o. male seen by Zoom video link for follow-up of  Remote STEMI in January 2014.  At that time he had an occluded circumflex and a 3.5 x 23 mm vision stent was placed.  He is still working full-time as a  and is physically active.  He notes a bit of leg heaviness when he climbs up and down stairs but no chest pain, exertional dyspnea, sense of palpitations or edema.  No other health issues.  He is due for lab testing.  Treadmill has been recommended in the past because the patient some employment he moves frequently.  He does not have a primary care physician in his community in [place].    ROS   Denies any recent fevers or chills. No nausea or vomiting. No chest pains or shortness of breath.  No weight loss, no cough, no edema.  Some leg heaviness as discussed above    Allergies   Allergen Reactions   • Lamisil [Terbinafine Hcl]        Current medicines (including changes today)  Current Outpatient Medications   Medication Sig Dispense Refill   • metoprolol SR (TOPROL XL) 50 MG TABLET SR 24 HR Take 1 Tab by mouth every day. 90 Tab 3   • atorvastatin (LIPITOR) 40 MG Tab Take 1 Tab by mouth every day. 90 Tab 3   • sildenafil citrate (VIAGRA) 100 MG tablet Take 1 Tab by mouth as needed for Erectile Dysfunction. 20 Tab 7   • omeprazole (PRILOSEC) 20 MG delayed-release capsule Take 20 mg by mouth every day.     • varenicline (CHANTIX STARTING MONTH SONIA)  "0.5 MG X 11 & 1 MG X 42 tablet As directed 56 Tab 3   • varenicline (CHANTIX CONTINUING MONTH SONIA) 1 MG tablet Take 1 Tab by mouth 2 times a day. 60 Tab 3   • aspirin EC (ECOTRIN) 81 MG TBEC Take 81 mg by mouth every day.     • nitroglycerin (NITROSTAT) 0.4 MG SUBL Place 1 Tab under tongue as needed for Chest Pain. 25 Tab 1   • losartan (COZAAR) 100 MG Tab Take 1 Tab by mouth every day. (Patient not taking: Reported on 5/25/2017) 90 Tab 2     No current facility-administered medications for this visit.        Patient Active Problem List    Diagnosis Date Noted   • Essential hypertension, benign 01/16/2014     Priority: High   • Tobacco use 01/16/2014     Priority: High   • STEMI (ST elevation myocardial infarction) (HCC) 01/10/2014     Priority: High   • Stented coronary artery 01/09/2015   • CAD (coronary artery disease) bare-metal stent to an occluded circumflex in January 2014 12/17/2014   • Hyperlipidemia 12/17/2014   • Undiagnosed cardiac murmurs 12/17/2014       Family History   Problem Relation Age of Onset   • Cancer Father    • Heart Disease Father    • Heart Attack Father 57       He  has a past medical history of CAD (coronary artery disease) (January 2014), CAD (coronary artery disease) bare-metal stent to an occluded circumflex in January 2014 (12/17/2014), COPD, Hyperlipidemia, Hyperlipidemia (12/17/2014), Hypertension, NSTEMI (non-ST elevated myocardial infarction) ([facility]), and Undiagnosed cardiac murmurs (12/17/2014).  He  has a past surgical history that includes hernia repair.       Objective:   Ht 1.778 m (5' 10\")   Wt 76.2 kg (168 lb)   BMI 24.11 kg/m²     Physical Exam: Exam by Zoom video link  Constitutional: Alert, no distress, well-groomed.  Psych: Alert and oriented x3, normal affect and mood.       Assessment and Plan:   The following treatment plan was discussed:     1. Essential hypertension, benign  - metoprolol SR (TOPROL XL) 50 MG TABLET SR 24 HR; Take 1 Tab by mouth every day.  " Dispense: 90 Tab; Refill: 3  - Lipid Profile; Future  - Comp Metabolic Panel; Future  - CBC WITH DIFFERENTIAL; Future    2. Hyperlipidemia, unspecified hyperlipidemia type  - atorvastatin (LIPITOR) 40 MG Tab; Take 1 Tab by mouth every day.  Dispense: 90 Tab; Refill: 3    3. Erectile dysfunction, unspecified erectile dysfunction type  - sildenafil citrate (VIAGRA) 100 MG tablet; Take 1 Tab by mouth as needed for Erectile Dysfunction.  Dispense: 20 Tab; Refill: 7    4. ST elevation myocardial infarction involving right coronary artery (HCC)  - Lipid Profile; Future  - Comp Metabolic Panel; Future  - CBC WITH DIFFERENTIAL; Future    5. Stented coronary artery  - Lipid Profile; Future  - Comp Metabolic Panel; Future  - CBC WITH DIFFERENTIAL; Future    6. Tobacco use  - CBC WITH DIFFERENTIAL; Future        Follow-up: No follow-ups on file.       Status post; he is now 7 years post event and has had no angina.  He is physically active.  Cigarette smoking: He still smoke about 1/4 pack a day.    Hyperlipidemia: Continue current dose of statin.  He is due for stress testing.  I have encouraged him to find a local doctor and get a treadmill also have his peripheral pulses examined to ensure he does not have peripheral vascular disease.  He will call or be seen again via Zoom if troubles arise otherwise will reevaluate in 1 year.

## 2021-01-07 ENCOUNTER — TELEPHONE (OUTPATIENT)
Dept: CARDIOLOGY | Facility: MEDICAL CENTER | Age: 61
End: 2021-01-07

## 2021-01-07 NOTE — TELEPHONE ENCOUNTER
I called pt at 8:00am, 8:05am, and 8:09am for his virtual visit this morning with Dr. Marshall. Each time the pt didn't answer.    I did leave a VM regarding his visit with Dr. Marshall scheduled for 8:00am and asked that he give us a call back at [phone].

## 2021-01-08 ENCOUNTER — TELEPHONE (OUTPATIENT)
Dept: CARDIOLOGY | Facility: MEDICAL CENTER | Age: 61
End: 2021-01-08

## 2021-01-08 NOTE — TELEPHONE ENCOUNTER
Pt. Sent My Chart message asking whether you wanted him to schedule stress test. You did order lab testing, which has been mailed to pt.  To Dr. Marshall

## 2021-01-12 NOTE — TELEPHONE ENCOUNTER
Per Dr. Marshall: pt. Lives in [place]. He needs to establish with a local cardiologist to schedule an exercise treadmill. Pt. Of course can continue to see Dr. Marshall when he is in [place].  Pt. Notified via My Chart.

## 2021-01-15 ENCOUNTER — TELEPHONE (OUTPATIENT)
Dept: CARDIOLOGY | Facility: MEDICAL CENTER | Age: 61
End: 2021-01-15

## 2021-01-15 NOTE — TELEPHONE ENCOUNTER
Appointment Today    Called and spoke to pt regarding their virtual visit today and pt stated that he wont be able to do the virtual video today due to being busy at work and he also stated that he will call us back to reschedule another appt. Gave pt call back number at [phone].

## 2021-02-04 ENCOUNTER — TELEMEDICINE (OUTPATIENT)
Dept: CARDIOLOGY | Facility: MEDICAL CENTER | Age: 61
End: 2021-02-04

## 2021-02-04 VITALS — HEIGHT: 70 IN | BODY MASS INDEX: 24.05 KG/M2 | WEIGHT: 168 LBS

## 2021-02-04 DIAGNOSIS — I25.10 CORONARY ARTERY DISEASE INVOLVING NATIVE CORONARY ARTERY OF NATIVE HEART WITHOUT ANGINA PECTORIS: ICD-10-CM

## 2021-02-04 DIAGNOSIS — I10 ESSENTIAL HYPERTENSION, BENIGN: ICD-10-CM

## 2021-02-04 RX ORDER — AZELASTINE 1 MG/ML
SPRAY, METERED NASAL
COMMUNITY
Start: 2021-01-12

## 2021-02-10 NOTE — PROGRESS NOTES
Patient was scheduled for a virtual visit.  He was requested to get a treadmill prior to his follow-up visit here.  Since he had not gotten his treadmill scheduled yet, the appointment was canceled.  I did speak very briefly on the phone to him.  He is doing well without any angina.

## 2021-02-17 DIAGNOSIS — Z95.5 STENTED CORONARY ARTERY: ICD-10-CM

## 2021-02-17 DIAGNOSIS — I21.11 ST ELEVATION MYOCARDIAL INFARCTION INVOLVING RIGHT CORONARY ARTERY (HCC): ICD-10-CM

## 2021-02-17 DIAGNOSIS — I10 ESSENTIAL HYPERTENSION, BENIGN: ICD-10-CM

## 2021-02-17 DIAGNOSIS — Z72.0 TOBACCO USE: ICD-10-CM

## 2025-07-17 ENCOUNTER — OFFICE (OUTPATIENT)
Dept: URBAN - METROPOLITAN AREA CLINIC 10 | Facility: CLINIC | Age: 65
End: 2025-07-17
Payer: COMMERCIAL

## 2025-07-17 VITALS
HEART RATE: 91 BPM | OXYGEN SATURATION: 98 % | SYSTOLIC BLOOD PRESSURE: 132 MMHG | WEIGHT: 157 LBS | HEIGHT: 70 IN | DIASTOLIC BLOOD PRESSURE: 70 MMHG

## 2025-07-17 DIAGNOSIS — K92.1 MELENA: ICD-10-CM

## 2025-07-17 DIAGNOSIS — Z80.0 FAMILY HISTORY OF MALIGNANT NEOPLASM OF DIGESTIVE ORGANS: ICD-10-CM

## 2025-07-17 PROCEDURE — 99204 OFFICE O/P NEW MOD 45 MIN: CPT | Performed by: REGISTERED NURSE

## 2025-07-17 RX ORDER — POLYETHYLENE GLYCOL 3350, SODIUM SULFATE, POTASSIUM CHLORIDE, MAGNESIUM SULFATE, AND SODIUM CHLORIDE FOR ORAL SOLUTION 178.7-7.3G
KIT ORAL
Qty: 1 | Refills: 0 | Status: COMPLETED
Start: 2025-07-17 | End: 2025-07-21

## 2025-07-17 RX ORDER — HYDROCORTISONE 25 MG/G
CREAM TOPICAL
Qty: 1 | Refills: 1 | Status: ACTIVE
Start: 2025-07-17